# Patient Record
Sex: MALE | Race: WHITE | NOT HISPANIC OR LATINO | URBAN - NONMETROPOLITAN AREA
[De-identification: names, ages, dates, MRNs, and addresses within clinical notes are randomized per-mention and may not be internally consistent; named-entity substitution may affect disease eponyms.]

---
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
## 2022-01-31 NOTE — PATIENT DISCUSSION
Pt aware VA will be limited s/p sx due to CRVO and may never clear up due to retinal pathology but aware the periphary VA will be brighter s/p sx.

## 2022-01-31 NOTE — PATIENT DISCUSSION
waiting on retinal and medical clearance prior to scheduling sx. Pt wants Feb 10th, scheduled tentative but will be out of town feb 16th till the end of feb.

## 2022-01-31 NOTE — PATIENT DISCUSSION
Pt aware VA will be limited s/p sx due to #1 and may never clear up due to retinal pathology but aware the periphary VA will be brighter s/p sx.

## 2022-01-31 NOTE — PATIENT DISCUSSION
Recommend referral to retina for further evaluation and possible treatment prior to cataract surgery.

## 2022-01-31 NOTE — PATIENT DISCUSSION
The types of intraocular lenses were reviewed with the patient along with a discussion of their various strengths and weaknesses. Basic galindo option only recommended and pt agrees.

## 2022-02-10 NOTE — PATIENT DISCUSSION
+topical +endo -van +pmn. OT Acute Initial Evaluation/Discharge  Plan of Care Note            Pt seen on [facility] nursing unit.        ASSESSMENT:   Emphasis of session included OT initial evaluation.  Pt lives alone in a senior apartment with no steps to enter his apartment.  Pt reports having a tub shower, grab bars, shower chair and bar by his toilet for safety.  Pt reports that he is independent with ADL's/IADL's and modified independent with ambulation.  Pt does have support if needed.  He is planning to move to [place] to be closer to his daughter.   Patient's overall level of function is at baseline.  Pt demonstrates safe functional transfers and ambulation using his cane-mod independent x 20 ft; no LOB.  Pt reports no concerns about basic self cares.  At this time d/c OT no skilled intervention is warranted at this time.            PLAN:   Continue skilled OT, including the following                     DIAGNOSIS:  No diagnosis found.    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       TASK MODIFICATION FOR EVALUATION ONLY (delete if not evaluation):  Task Modification: clinical decision making of low complexity, no task modification    SUBJECTIVE: Subjective: pt agreeable to OT evaluation (05/27/17 0823)    OBJECTIVE:  Precautions:  Precautions  Other Precautions: alarms/contact precautions (05/27/17 0823)  RN reported Martinez Fall Scale Score: 60 (>45 is considered at risk of fall)  Vitals:     Activity Tolerance:  no limits in  patient's ability to participate in session  Exercise:       Functional Status (as of date/time noted)  ADLs:  Self Cares/ADL's  Self Cares/ADL's Comments #1: pt reports no concerns about his ability to dress, shower or drew (05/27/17 0823)    Household Mobility:  Household Mobility  Sit to Stand: Modified Independent (05/27/17 0823)  Stand to Sit: Modified Independent (05/27/17 0823)  Transfer Equipment: cane (05/27/17 0823)  Sitting - Dynamic: Independent (05/27/17 0823)  Standing - Static: Modified Independent (05/27/17 0823)  Standing - Dynamic: Modified Independent (05/27/17 0823)  Base of Support: Hip Width (05/27/17 0823)  Household Mobility Comments #1: pt demonstrated functional ambulation using cane and ambulated approximately 20 ft in room without LOB, safely manages in tight spaces and while turning (05/27/17 0823)    Home Management:       See OT flowsheet for full details regarding the OT therapy provided.    GOALS:       EDUCATION:   On this date, the patient was educated on the role of OT.    The response to education was: Verbalizes understanding    RECOMMENDATIONS FOR DISCHARGE:  Recommendations for Discharge: OT: Home       PT/OT ADL Equipment for Discharge: none anticipated (05/27/17 0823)

## 2022-02-10 NOTE — PATIENT DISCUSSION
Drink plenty of fluids and stay hydrated   Take Zofran as needed for nausea    Follow-up with your PCP next week    Return to ER with worsening symptoms The types of intraocular lenses were reviewed with the patient along with a discussion of their various strengths and weaknesses. Basic galindo option only recommended and pt agrees.

## 2022-02-10 NOTE — PATIENT DISCUSSION
Cleared from retina per Dr. Criselda Tarango to proceed with cataract surgery. Will continue to monitor closely

## 2022-04-09 ASSESSMENT — VISUAL ACUITY
OD_SC: 20/25
OS_SC: 20/25
OS_SC: 20/30-1

## 2022-04-09 ASSESSMENT — TONOMETRY
OS_IOP_MMHG: 17
OD_IOP_MMHG: 17

## 2023-04-19 NOTE — PATIENT DISCUSSION
Monitor. Patient informed inhaler sent to pharmacy. Aware to take allergy medication for management of allergies such as Zyrtec or Claritin and to use inhaler only as needed.
